# Patient Record
Sex: MALE | Race: WHITE
[De-identification: names, ages, dates, MRNs, and addresses within clinical notes are randomized per-mention and may not be internally consistent; named-entity substitution may affect disease eponyms.]

---

## 2017-01-23 ENCOUNTER — HOSPITAL ENCOUNTER (EMERGENCY)
Dept: HOSPITAL 75 - ER | Age: 22
Discharge: HOME | End: 2017-01-23
Payer: COMMERCIAL

## 2017-01-23 VITALS — HEIGHT: 72 IN | WEIGHT: 145 LBS | BODY MASS INDEX: 19.64 KG/M2

## 2017-01-23 VITALS — DIASTOLIC BLOOD PRESSURE: 78 MMHG | SYSTOLIC BLOOD PRESSURE: 128 MMHG

## 2017-01-23 DIAGNOSIS — R19.7: ICD-10-CM

## 2017-01-23 DIAGNOSIS — F17.210: ICD-10-CM

## 2017-01-23 DIAGNOSIS — F10.10: ICD-10-CM

## 2017-01-23 DIAGNOSIS — R11.2: ICD-10-CM

## 2017-01-23 DIAGNOSIS — R10.13: Primary | ICD-10-CM

## 2017-01-23 LAB
ALBUMIN SERPL-MCNC: 4.4 G/DL (ref 3.2–4.5)
ALT SERPL-CCNC: 27 U/L (ref 0–55)
AMYLASE SERPL-CCNC: 49 U/L (ref 25–125)
ANION GAP SERPL CALC-SCNC: 8 MMOL/L (ref 5–14)
AST SERPL-CCNC: 29 U/L (ref 5–34)
BASOPHILS # BLD AUTO: 0 10^3/UL (ref 0–0.1)
BASOPHILS NFR BLD AUTO: 0 % (ref 0–10)
BILIRUB SERPL-MCNC: 0.3 MG/DL (ref 0.1–1)
BILIRUB UR QL STRIP: NEGATIVE
BUN SERPL-MCNC: 12 MG/DL (ref 7–18)
BUN/CREAT SERPL: 14
CALCIUM SERPL-MCNC: 9.2 MG/DL (ref 8.5–10.1)
CHLORIDE SERPL-SCNC: 105 MMOL/L (ref 98–107)
CO2 SERPL-SCNC: 25 MMOL/L (ref 21–32)
CREAT SERPL-MCNC: 0.84 MG/DL (ref 0.6–1.3)
EOSINOPHIL # BLD AUTO: 0.1 10^3/UL (ref 0–0.3)
EOSINOPHIL NFR BLD AUTO: 1 % (ref 0–10)
ERYTHROCYTE [DISTWIDTH] IN BLOOD BY AUTOMATED COUNT: 13.1 % (ref 10–14.5)
ETHANOL SERPL-MCNC: < 10 MG/DL (ref ?–10)
GFR SERPLBLD BASED ON 1.73 SQ M-ARVRAT: > 60 ML/MIN
GLUCOSE SERPL-MCNC: 95 MG/DL (ref 70–105)
KETONES UR QL STRIP: NEGATIVE
LEUKOCYTE ESTERASE UR QL STRIP: NEGATIVE
LIPASE SERPL-CCNC: 14 U/L (ref 8–78)
LYMPHOCYTES # BLD AUTO: 3 X 10^3 (ref 1–4)
LYMPHOCYTES NFR BLD AUTO: 33 % (ref 12–44)
MCH RBC QN AUTO: 31 PG (ref 25–34)
MCHC RBC AUTO-ENTMCNC: 34 G/DL (ref 32–36)
MCV RBC AUTO: 92 FL (ref 80–99)
MONOCYTES # BLD AUTO: 0.8 X 10^3 (ref 0–1)
MONOCYTES NFR BLD AUTO: 8 % (ref 0–12)
NEUTROPHILS # BLD AUTO: 5.1 X 10^3 (ref 1.8–7.8)
NEUTROPHILS NFR BLD AUTO: 57 % (ref 42–75)
NITRITE UR QL STRIP: NEGATIVE
PH UR STRIP: 6 [PH] (ref 5–9)
PLATELET # BLD: 276 10^3/UL (ref 130–400)
PMV BLD AUTO: 9.4 FL (ref 7.4–10.4)
POTASSIUM SERPL-SCNC: 4.9 MMOL/L (ref 3.6–5)
PROT SERPL-MCNC: 7 G/DL (ref 6.4–8.2)
PROT UR QL STRIP: NEGATIVE
RBC # BLD AUTO: 4.75 10^6/UL (ref 4.35–5.85)
SODIUM SERPL-SCNC: 138 MMOL/L (ref 135–145)
SP GR UR STRIP: 1.02 (ref 1.02–1.02)
SQUAMOUS #/AREA URNS HPF: (no result) /HPF
UROBILINOGEN UR-MCNC: NORMAL MG/DL
WBC # BLD AUTO: 9 10^3/UL (ref 4.3–11)
WBC #/AREA URNS HPF: (no result) /HPF

## 2017-01-23 PROCEDURE — 80053 COMPREHEN METABOLIC PANEL: CPT

## 2017-01-23 PROCEDURE — 83690 ASSAY OF LIPASE: CPT

## 2017-01-23 PROCEDURE — 81000 URINALYSIS NONAUTO W/SCOPE: CPT

## 2017-01-23 PROCEDURE — 80320 DRUG SCREEN QUANTALCOHOLS: CPT

## 2017-01-23 PROCEDURE — 85025 COMPLETE CBC W/AUTO DIFF WBC: CPT

## 2017-01-23 PROCEDURE — 80306 DRUG TEST PRSMV INSTRMNT: CPT

## 2017-01-23 PROCEDURE — 82150 ASSAY OF AMYLASE: CPT

## 2017-01-23 PROCEDURE — 36415 COLL VENOUS BLD VENIPUNCTURE: CPT

## 2017-01-23 NOTE — ED GI
General


Chief Complaint:  Abdominal/GI Problems


Stated Complaint:  AB LOWER BACK PAIN


Source of Information:  Patient





History of Present Illness


Time Seen By Provider:  01:20


Initial Comments


C/O EPIGASTRIC ABDOMINAL AND MID BACK PAIN X 1 WEEK


HAS ALSO HAD NAUSEA/VOMITING/DIARRHEA X 1 WEEK,BUT NO VOMITING OR DIARRHEA FOR 

A FEW DAYS, AND HAS BEEN EATING AND DRINKING WELL


NO FEVER


NO URINARY SYMPTOMS


NO HISTORY OF SIMILAR


\NO SICK CONTACTS OR SUSPICIOUS FOODS


STATES HE THINKS IT IS FROM HIS DRINKING--DRINKS 18 PACK OF BEER EVERY DAY, 

PLUS HARD LIQUOR--WHISKEY AND VODKA, AT TIMES


WENT TO [facility] ON WEDNESDAY 01/18/17 FOR THIS PROBLEM AND WAS TOLD HE HAD 

DECREASED RENAL FUNCTION AND WAS GIVEN RX FOR PREVACID AND ZOFRAN


STATES ZOFRAN HELPS WITH NAUSEA


STATES HE HAS NOT HAD ANY ALCOHOL SINCE WEDNESDAY





PCP: [facility], ALSO SEES DR. HEIN IN [place]





Allergies and Home Medications


Allergies


Coded Allergies:  


     No Known Drug Allergies (Unverified , 1/23/17)





Home Medications


Sucralfate 1 Gm/10 Ml Oral.susp #400 1 GM PO QID AC AND HS 


   Prescribed by: SHANNAN GIBBONS on 1/23/17 0223





Review of Systems


Constitutional:   no symptoms reported


Respiratory:   No Symptoms Reported


Cardiovascular:   No Symptoms Reported


Gastrointestinal:   See HPI Abdominal Pain Diarrhea NauseaDenies Poor Appetite, 

Denies Poor Fluid Intake,  Vomiting


Genitourinary:   No Symptoms Reported


Musculoskeletal:   see HPI back pain


Skin:   no symptoms reported


Psychiatric/Neurological:   No Symptoms Reported


Endocrine:   No Symptoms Reported


Hematologic/Lymphatic:   No Symptoms Reported





Past Medical-Social-Family Hx


Patient Social History


Alcohol Use:  Regular Use (18 PACK A DAY, PLUS HARD LIQUOR AT TIMES--WHISKEY, 

VODKA)


Recreational Drug Use:  Yes (PT DENIES BUT TESTED + FOR THC 01/23/17)


Smoking Status:  Current Everyday Smoker (1 PPD)


Type Used:  Cigarettes


Recent Foreign Travel:  No


Contact w/Someone Who Travel:  No





Surgeries


HX Surgeries:  Yes (BILATERAL KNEE SCOPES, I&D)


Surgeries:  Orthopedic





Respiratory


Hx Respiratory Disorders:  No





Cardiovascular


Hx Cardiac Disorders:  No





Neurological


Hx Neurological Disorders:  No





Reproductive System


Hx Reproductive Disorders:  No





Genitourinary


Hx Genitourinary Disorders:  No





Gastrointestinal


Hx Gastrointestinal Disorders:  No





Musculoskeletal


Hx Musculoskeletal Disorders:  Yes (BILATERAL KNEE SCOPES)





Endocrine


Hx Endocrine Disorders:  No





HEENT


HX ENT Disorders:  No





Cancer


Hx Cancer:  No





Psychosocial


Hx Psychiatric Problems:  No





Integumentary


HX Skin/Integumentary Disorder:  No





Blood Transfusions


Hx Blood Disorders:  No





Physical Exam


Vital Signs





 VS - Last 72 Hours, by Label








 1/23/17 1/23/17





 01:09 02:28


 


Temp 98.0 


 


Pulse 59 70


 


Resp 18 18


 


B/P 130/87 


 


Pulse Ox 100 99


 


O2 Delivery Room Air Room Air





Capillary Refill :


General Appearance:   WD/WN no apparent distress other (WALKS UPRIGHT AND MOVES 

QUICKLY WITHOUT DIFFICULTY. DOES NOT APPEAR TO BE IN ANY DISCOMFORT) thin


HEENT:   PERRL/EOMINo scleral icterus (R), No scleral icterus (L)


Neck:   normal inspection


Respiratory:   normal breath sounds no respiratory distress no accessory muscle 

use


Cardiovascular:   normal peripheral pulses regular rate, rhythm no edema no JVD 

no murmur


Gastrointestinal:   normal bowel sounds soft no organomegaly no pulsatile 

massNo distended, No guarding, No rebound,  tenderness (MILD EPIGASTRIC 

TENDERNESS)No hernia, No mass


Extremities:   normal inspection


Back:   normal inspection no CVA tenderness no vertebral tenderness other (NO 

BACK TENDERNESS)


Neurologic/Psychiatric:   CNs II-XII nml as tested no motor/sensory deficits 

alert normal mood/affect oriented x 3


Skin:   normal color warm/dryNo rash





Progress/Results/Core Measures


Results/Orders


Lab Results





 Laboratory Tests








Test


  1/23/17


01:17 1/23/17


01:55 Range/Units


 


 


Alanine Aminotransferase


(ALT/SGPT) 27 


  


  0-55  U/L


 


 


Albumin 4.4   3.2-4.5  G/DL


 


Alkaline Phosphatase 51     U/L


 


Amylase Level 49     U/L


 


Anion Gap 8   5-14  MMOL/L


 


Aspartate Amino Transf


(AST/SGOT) 29 


  


  5-34  U/L


 


 


BUN/Creatinine Ratio 14    


 


Basophils # (Auto)


  0.0 


  


  0.0-0.1


10^3/uL


 


Basophils (%) (Auto) 0   0-10  %


 


Blood Urea Nitrogen 12   7-18  MG/DL


 


Calcium Level 9.2   8.5-10.1  MG/DL


 


Carbon Dioxide Level 25   21-32  MMOL/L


 


Chloride Level 105     MMOL/L


 


Creatinine


  0.84 


  


  0.60-1.30


MG/DL


 


Eosinophils # (Auto)


  0.1 


  


  0.0-0.3


10^3/uL


 


Eosinophils (%) (Auto) 1   0-10  %


 


Estimat Glomerular Filtration


Rate > 60 


  


   


 


 


Glucose Level 95     MG/DL


 


Hematocrit 44   40-54  %


 


Hemoglobin 14.9   13.3-17.7  G/DL


 


Lipase 14   8-78  U/L


 


Lymphocytes # (Auto) 3.0   1.0-4.0  X 10^3


 


Lymphocytes (%) (Auto) 33   12-44  %


 


Mean Corpuscular Hemoglobin 31   25-34  PG


 


Mean Corpuscular Hemoglobin


Concent 34 


  


  32-36  G/DL


 


 


Mean Corpuscular Volume 92   80-99  FL


 


Mean Platelet Volume 9.4   7.4-10.4  FL


 


Monocytes # (Auto) 0.8   0.0-1.0  X 10^3


 


Monocytes (%) (Auto) 8   0-12  %


 


Neutrophils # (Auto) 5.1   1.8-7.8  X 10^3


 


Neutrophils (%) (Auto) 57   42-75  %


 


Platelet Count


  276 


  


  130-400


10^3/uL


 


Potassium Level 4.9   3.6-5.0  MMOL/L


 


Red Blood Count


  4.75 


  


  4.35-5.85


10^6/uL


 


Red Cell Distribution Width 13.1   10.0-14.5  %


 


Serum Alcohol < 10   <10  MG/DL


 


Sodium Level 138   135-145  MMOL/L


 


Total Bilirubin 0.3   0.1-1.0  MG/DL


 


Total Protein 7.0   6.4-8.2  G/DL


 


White Blood Count


  9.0 


  


  4.3-11.0


10^3/uL


 


Ur Tricyclic Antidepressants


Screen 


  NEGATIVE 


  NEGATIVE  


 


 


Urine Amphetamines Screen  NEGATIVE  NEGATIVE  


 


Urine Bacteria  NEGATIVE   /HPF


 


Urine Barbiturates Screen  NEGATIVE  NEGATIVE  


 


Urine Benzodiazepines Screen  NEGATIVE  NEGATIVE  


 


Urine Bilirubin  NEGATIVE  NEGATIVE  


 


Urine Cannabinoids Screen  POSITIVE H NEGATIVE  


 


Urine Casts  NONE   /LPF


 


Urine Clarity  CLEAR   


 


Urine Cocaine Screen  NEGATIVE  NEGATIVE  


 


Urine Color  YELLOW   


 


Urine Crystals  NONE   /LPF


 


Urine Culture Indicated  NO   


 


Urine Glucose (UA)  NEGATIVE  NEGATIVE  


 


Urine Ketones  NEGATIVE  NEGATIVE  


 


Urine Leukocyte Esterase  NEGATIVE  NEGATIVE  


 


Urine Methadone Screen  NEGATIVE  NEGATIVE  


 


Urine Methamphetamines Screen  NEGATIVE  NEGATIVE  


 


Urine Mucus  NEGATIVE   /LPF


 


Urine Nitrite  NEGATIVE  NEGATIVE  


 


Urine Opiates Screen  NEGATIVE  NEGATIVE  


 


Urine Oxycodone Screen  NEGATIVE  NEGATIVE  


 


Urine Phencyclidine Screen  NEGATIVE  NEGATIVE  


 


Urine Propoxyphene Screen  NEGATIVE  NEGATIVE  


 


Urine Protein  NEGATIVE  NEGATIVE  


 


Urine RBC  NONE   /HPF


 


Urine RBC (Auto)  NEGATIVE  NEGATIVE  


 


Urine Specific Gravity  1.020  1.016-1.022  


 


Urine Squamous Epithelial


Cells 


  RARE 


   /HPF


 


 


Urine Urobilinogen  NORMAL  NORMAL  MG/DL


 


Urine WBC  NONE   /HPF


 


Urine pH  6  5-9  








My Orders





 Orders-SHANNAN GIBBONS DO


Ua Culture If Indicated (1/23/17 01:21)


Saline Lock/Iv-Start (1/23/17 01:23)


Alcohol (1/23/17 01:23)


Amylase (1/23/17 01:23)


Cbc With Automated Diff (1/23/17 01:23)


Comprehensive Metabolic Panel (1/23/17 01:23)


Drug Screen Stat (Urine) (1/23/17 01:23)


Lipase (1/23/17 01:23)


Antacid  Suspension (Mylanta  Suspension (1/23/17 02:00)


Lidocaine 2% Viscous 15 Ml (Xylocaine Vi (1/23/17 02:00)





Medications Given in ED





 Current Medications








 Medications  Dose


 Ordered  Sig/Malorie


 Route  Start Time


 Stop Time Status Last Admin


Dose Admin


 


 Al Hydrox/Mg


 Hydrox/Simethicone  30 ml  ONCE  ONCE


 PO  1/23/17 02:00


 1/23/17 02:01 DC 1/23/17 02:05


30 ML


 


 Lidocaine HCl  5 ml  ONCE  ONCE


 PO  1/23/17 02:00


 1/23/17 02:01 DC 1/23/17 02:05


5 ML








Vital Signs/I&O





 Vital Sign - Last 12Hours








 1/23/17 1/23/17





 01:09 02:28


 


Temp 98.0 


 


Pulse 59 70


 


Resp 18 18


 


B/P 130/87 


 


Pulse Ox 100 99


 


O2 Delivery Room Air Room Air








Progress Note :  


Progress Note


SYMPTOMS IMPROVED WITH GI COCKTAIL





Departure


Impression


Impression:  


 Primary Impression:  


 Epigastric abdominal pain


 Additional Impressions:  


 SUSPECTED ALCOHOLIC GASTRITIS


 Alcohol abuse


Disposition:  01 HOME, SELF-CARE


Condition:  Stable





Departure-Patient Inst.


Referrals:  


NO,LOCAL PHYSICIAN (PCP)


Primary Care Physician








[facility]


Patient Instructions:  ALCOHOL AND SUBSTANCE ABUSE, Gastritis (DC)





Add. Discharge Instructions:


NO ALCOHOL





LOTS OF CLEAR LIQUIDS--NO COFFEE, POP OR TEA





BRATS DIET--BANANAS, RICE, APPLESAUCE, TOAST, SALTINES





FOLLOW UP WITH [facility] THIS WEEK FOR FURTHER CARE





All discharge instructions reviewed with patient and/or family. Voiced 

understanding.


Scripts


Sucralfate (Carafate)1 Gm/10 Ml Oral.susp1 Gm PO QID AC AND HS #400 ML


   Prov:SHANNAN GIBBONS DO         1/23/17


Work/School Note:  Work Release Form   Date Seen in the Emergency Department:  

Jan 23, 2017








SHANNAN GIBBONS DO Jan 23, 2017 01:40

## 2018-12-18 ENCOUNTER — HOSPITAL ENCOUNTER (EMERGENCY)
Dept: HOSPITAL 75 - ER | Age: 23
Discharge: HOME | End: 2018-12-18
Payer: COMMERCIAL

## 2018-12-18 VITALS — WEIGHT: 145 LBS | BODY MASS INDEX: 19.22 KG/M2 | HEIGHT: 73 IN

## 2018-12-18 VITALS — SYSTOLIC BLOOD PRESSURE: 126 MMHG | DIASTOLIC BLOOD PRESSURE: 54 MMHG

## 2018-12-18 DIAGNOSIS — Z88.0: ICD-10-CM

## 2018-12-18 DIAGNOSIS — S16.1XXA: Primary | ICD-10-CM

## 2018-12-18 DIAGNOSIS — R40.2142: ICD-10-CM

## 2018-12-18 DIAGNOSIS — F17.210: ICD-10-CM
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
## 2018-12-18 DIAGNOSIS — R40.2252: ICD-10-CM

## 2018-12-18 DIAGNOSIS — R40.2362: ICD-10-CM
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
## 2018-12-18 PROCEDURE — 70450 CT HEAD/BRAIN W/O DYE: CPT

## 2018-12-18 PROCEDURE — 72125 CT NECK SPINE W/O DYE: CPT

## 2018-12-18 PROCEDURE — 72128 CT CHEST SPINE W/O DYE: CPT
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
## 2018-12-18 NOTE — XMS REPORT
[facility]

 Created on: 2017



Jodi Downs

External Reference #: [id number]

: 1995

Sex: Male



Demographics







 Address  [address] [place] [address]  [id number]

 

 Preferred Language  Unknown

 

 Marital Status  Unknown

 

 Worship Affiliation  Unknown

 

 Race  Unknown

 

 Ethnic Group  Unknown





Author







 Author  JELLY PETERSEN

 

 [facility]

 

 Address  [address]  [place]  [address]



 

 Phone  [phone]







Care Team Providers







 Care Team Member Name  Role  Phone

 

 JELLY PETERSEN  Unavailable  [phone]







PROBLEMS







 Type  Condition  ICD9-CM Code  OIY28-CT Code  Onset Dates  Condition Status  
SNOMED Code

 

 Problem  Cigarette nicotine dependence without complication     F17.210     
Active  [id number]







ALLERGIES







 Substance  Reaction  Event Type  Date  Status

 

 Amoxicillin  Unknown  Drug Allergy    Active







SOCIAL HISTORY

No smoking Hx information available



PLAN OF CARE





VITAL SIGNS







 Weight  145 lbs  2017

 

 Temperature  98.1 degrees Fahrenheit  2017

 

 Heart Rate  102 bpm  2017

 

 Respiratory Rate  16   2017

 

 Blood pressure systolic  110 mmHg  2017

 

 Blood pressure diastolic  70 mmHg  2017







MEDICATIONS







 Medication  Instructions  Dosage  Frequency  Start Date  End Date  Duration  
Status

 

 Ondansetron HCl 4 MG  Orally 3 times a day  1 tablets  8h       10 
days  Active







RESULTS







 Name  Result  Date  Reference Range

 

 H PYLORI (IN HOUSE)     2017   

 

 H. PYLORI         

 

 Control  +      

 

 Lot #  [id number]      

 

 Exp date  10/2017      

 

 CBC     2017   

 

 WBC  10.7     3.4-10.8

 

 RBC  4.81     4.14-5.80

 

 Hemoglobin  14.8     12.6-17.7

 

 Hematocrit  43.1     37.5-51.0

 

 MCV  90     79-97

 

 MCH  30.8     26.6-33.0

 

 MCHC  34.3     31.5-35.7

 

 RDW  14.0     12.3-15.4

 

 Platelets  288     150-379

 

 Neutrophils  71      

 

 Lymphs  22      

 

 Monocytes  6      

 

 Eos  1      

 

 Basos  0      

 

 Immature Cells         

 

 Neutrophils (Absolute)  7.5     1.4-7.0

 

 Lymphs (Absolute)  2.3     0.7-3.1

 

 Monocytes(Absolute)  0.7     0.1-0.9

 

 Eos (Absolute)  0.1     0.0-0.4

 

 Baso (Absolute)  0.0     0.0-0.2

 

 Immature Granulocytes  0      

 

 Immature Grans (Abs)  0.0     0.0-0.1

 

 NRBC         

 

 Hematology Comments:         

 

 CMP     2017   

 

 Glucose, Serum  81     65-99

 

 BUN  16     6-20

 

 Creatinine, Serum  0.73     0.76-1.27

 

 eGFR If NonAfricn Am  134         >59

 

 eGFR If Africn Am  154         >59

 

 BUN/Creatinine Ratio  22     8-19

 

 Sodium, Serum  141     134-144

 

 Potassium, Serum  4.5     3.5-5.2

 

 Chloride, Serum  102     

 

 Carbon Dioxide, Total  25     18-29

 

 Calcium, Serum  9.5     8.7-10.2

 

 Protein, Total, Serum  7.1     6.0-8.5

 

 Albumin, Serum  4.2     3.5-5.5

 

 Globulin, Total  2.9     1.5-4.5

 

 A/G Ratio  1.4     1.1-2.5

 

 Bilirubin, Total  0.3     0.0-1.2

 

 Alkaline Phosphatase, S  52     

 

 AST (SGOT)  16     0-40

 

 ALT (SGPT)  11     0-44







PROCEDURES







 Procedure  Date Ordered  Related Diagnosis  Body Site

 

 IMMUNOASSAY,INFECTIOUS AGENT  2017      

 

 COMPLETE CBC W/AUTO DIFF WBC  2017      

 

 Office Visit, Est Pt., Level 3  2017      

 

 COMPREHEN METABOLIC PANEL  2017      

 

 VENIPUNCT, ROUTINE*  2017      







IMMUNIZATIONS

No Known Immunizations
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
